# Patient Record
Sex: FEMALE | Race: WHITE | NOT HISPANIC OR LATINO | Employment: FULL TIME | URBAN - METROPOLITAN AREA
[De-identification: names, ages, dates, MRNs, and addresses within clinical notes are randomized per-mention and may not be internally consistent; named-entity substitution may affect disease eponyms.]

---

## 2019-06-26 PROBLEM — Z86.79 HISTORY OF PERICARDITIS: Status: ACTIVE | Noted: 2019-06-26

## 2019-06-26 PROBLEM — J45.909 ASTHMA: Status: ACTIVE | Noted: 2019-06-26

## 2019-07-05 ENCOUNTER — PRE VISIT (OUTPATIENT)
Dept: CARDIOLOGY | Facility: CLINIC | Age: 74
End: 2019-07-05
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
## 2019-07-05 NOTE — TELEPHONE ENCOUNTER
Attempted to call patient for more information for upcoming Dr. Gamez OV 7-22-19. Patient unable to talk.   Take to call back.

## 2019-07-20 NOTE — PROGRESS NOTES
HPI and Plan:                     CURRENT MEDICATIONS:  No current outpatient medications on file.       ALLERGIES     Allergies   Allergen Reactions     Penicillins Hives     Sulfa Drugs        PAST MEDICAL HISTORY:  Past Medical History:   Diagnosis Date     Asthma 2019     History of pericarditis 2019     NO ACTIVE PROBLEMS        PAST SURGICAL HISTORY:  Past Surgical History:   Procedure Laterality Date     APPENDECTOMY OPEN  10/15/2011    Procedure:APPENDECTOMY OPEN; Surgeon:JOHNNY HARRIS; Location: OR     SURGICAL HISTORY OF -   2012    Right carpal tunnel release     SURGICAL HISTORY OF -       Tonsillectomy       FAMILY HISTORY:  Family History   Problem Relation Age of Onset     Cancer Mother          age 54, metastatic melanoma     Hypertension Father      Diabetes Father          of MI age 74     Lipids Father      Diabetes Paternal Grandmother      Cerebrovascular Disease Paternal Grandmother        SOCIAL HISTORY:  Social History     Socioeconomic History     Marital status: Single     Spouse name: None     Number of children: 0     Years of education: None     Highest education level: None   Occupational History     Employer: SELF     Comment: [employer]   Social Needs     Financial resource strain: None     Food insecurity:     Worry: None     Inability: None     Transportation needs:     Medical: None     Non-medical: None   Tobacco Use     Smoking status: Former Smoker     Years: 10.00     Smokeless tobacco: Never Used   Substance and Sexual Activity     Alcohol use: No     Drug use: No     Sexual activity: Never   Lifestyle     Physical activity:     Days per week: None     Minutes per session: None     Stress: None   Relationships     Social connections:     Talks on phone: None     Gets together: None     Attends Nondenominational service: None     Active member of club or organization: None     Attends meetings of clubs or organizations: None     Relationship  "status: None     Intimate partner violence:     Fear of current or ex partner: None     Emotionally abused: None     Physically abused: None     Forced sexual activity: None   Other Topics Concern     Parent/sibling w/ CABG, MI or angioplasty before 65F 55M? Not Asked   Social History Narrative     None       Review of Systems:  Skin:  Positive for lumps or bumps     Eyes:  Positive for glasses    ENT:  Negative      Respiratory:  Positive for dyspnea on exertion 2 months ago   Cardiovascular:    Positive for;edema chest pressure 2 months ago  Gastroenterology: Positive for heartburn    Genitourinary:  Negative      Musculoskeletal:  Positive for joint pain    Neurologic:  Negative      Psychiatric:  Negative      Heme/Lymph/Imm:  Positive for allergies    Endocrine:  Negative        Physical Exam:  Vitals: /66   Pulse 76   Ht 1.575 m (5' 2\")   Wt 73.6 kg (162 lb 3.2 oz)   BMI 29.67 kg/m      Constitutional:           Skin:  warm and dry to the touch, no apparent skin lesions or masses noted          Head:  normocephalic, no masses or lesions        Eyes:           Lymph:      ENT:  no pallor or cyanosis, dentition good        Neck:           Respiratory:  normal breath sounds, clear to auscultation, normal A-P diameter, normal symmetry, normal respiratory excursion, no use of accessory muscles         Cardiac: regular rhythm, normal S1/S2, no S3 or S4, apical impulse not displaced, no murmurs, gallops or rubs                pulses full and equal, no bruits auscultated                                        GI:  abdomen soft, non-tender, BS normoactive, no mass, no HSM, no bruits        Extremities and Muscular Skeletal:  no deformities, clubbing, cyanosis, erythema observed              Neurological:  no gross motor deficits        Psych:           CC  Prince Willis MD  [address] [place] [id number]              "

## 2019-07-22 ENCOUNTER — OFFICE VISIT (OUTPATIENT)
Dept: CARDIOLOGY | Facility: CLINIC | Age: 74
End: 2019-07-22
Payer: MEDICARE

## 2019-07-22 VITALS
BODY MASS INDEX: 29.85 KG/M2 | WEIGHT: 162.2 LBS | SYSTOLIC BLOOD PRESSURE: 106 MMHG | HEART RATE: 76 BPM | DIASTOLIC BLOOD PRESSURE: 66 MMHG | HEIGHT: 62 IN

## 2019-07-22 DIAGNOSIS — Z86.79 HISTORY OF PERICARDITIS: Primary | ICD-10-CM

## 2019-07-22 DIAGNOSIS — R07.9 CHEST PAIN, UNSPECIFIED TYPE: ICD-10-CM

## 2019-07-22 PROCEDURE — 99204 OFFICE O/P NEW MOD 45 MIN: CPT | Performed by: INTERNAL MEDICINE

## 2019-07-22 PROCEDURE — 93000 ELECTROCARDIOGRAM COMPLETE: CPT | Performed by: INTERNAL MEDICINE

## 2019-07-22 ASSESSMENT — MIFFLIN-ST. JEOR: SCORE: 1178.98

## 2019-07-22 NOTE — LETTER
2019    Prince Willis MD  [address] [id number]    RE: Ella Woodson       Dear Colleague,    I had the pleasure of seeing Ella Woodson in the [facility].    HPI and Plan:                     CURRENT MEDICATIONS:  No current outpatient medications on file.       ALLERGIES     Allergies   Allergen Reactions     Penicillins Hives     Sulfa Drugs        PAST MEDICAL HISTORY:  Past Medical History:   Diagnosis Date     Asthma 2019     History of pericarditis 2019     NO ACTIVE PROBLEMS        PAST SURGICAL HISTORY:  Past Surgical History:   Procedure Laterality Date     APPENDECTOMY OPEN  10/15/2011    Procedure:APPENDECTOMY OPEN; Surgeon:JOHNNY HARRIS; Location: OR     SURGICAL HISTORY OF -   2012    Right carpal tunnel release     SURGICAL HISTORY OF -       Tonsillectomy       FAMILY HISTORY:  Family History   Problem Relation Age of Onset     Cancer Mother          age 54, metastatic melanoma     Hypertension Father      Diabetes Father          of MI age 74     Lipids Father      Diabetes Paternal Grandmother      Cerebrovascular Disease Paternal Grandmother        SOCIAL HISTORY:  Social History     Socioeconomic History     Marital status: Single     Spouse name: None     Number of children: 0     Years of education: None     Highest education level: None   Occupational History     Employer: SELF     Comment: [employer]   Social Needs     Financial resource strain: None     Food insecurity:     Worry: None     Inability: None     Transportation needs:     Medical: None     Non-medical: None   Tobacco Use     Smoking status: Former Smoker     Years: 10.00     Smokeless tobacco: Never Used   Substance and Sexual Activity     Alcohol use: No     Drug use: No     Sexual activity: Never   Lifestyle     Physical activity:     Days per week: None     Minutes per session: None     Stress: None   Relationships      "Social connections:     Talks on phone: None     Gets together: None     Attends Confucianist service: None     Active member of club or organization: None     Attends meetings of clubs or organizations: None     Relationship status: None     Intimate partner violence:     Fear of current or ex partner: None     Emotionally abused: None     Physically abused: None     Forced sexual activity: None   Other Topics Concern     Parent/sibling w/ CABG, MI or angioplasty before 65F 55M? Not Asked   Social History Narrative     None       Review of Systems:  Skin:  Positive for lumps or bumps     Eyes:  Positive for glasses    ENT:  Negative      Respiratory:  Positive for dyspnea on exertion 2 months ago   Cardiovascular:    Positive for;edema chest pressure 2 months ago  Gastroenterology: Positive for heartburn    Genitourinary:  Negative      Musculoskeletal:  Positive for joint pain    Neurologic:  Negative      Psychiatric:  Negative      Heme/Lymph/Imm:  Positive for allergies    Endocrine:  Negative        Physical Exam:  Vitals: /66   Pulse 76   Ht 1.575 m (5' 2\")   Wt 73.6 kg (162 lb 3.2 oz)   BMI 29.67 kg/m       Constitutional:           Skin:  warm and dry to the touch, no apparent skin lesions or masses noted          Head:  normocephalic, no masses or lesions        Eyes:           Lymph:      ENT:  no pallor or cyanosis, dentition good        Neck:           Respiratory:  normal breath sounds, clear to auscultation, normal A-P diameter, normal symmetry, normal respiratory excursion, no use of accessory muscles         Cardiac: regular rhythm, normal S1/S2, no S3 or S4, apical impulse not displaced, no murmurs, gallops or rubs                pulses full and equal, no bruits auscultated                                        GI:  abdomen soft, non-tender, BS normoactive, no mass, no HSM, no bruits        Extremities and Muscular Skeletal:  no deformities, clubbing, cyanosis, erythema observed          "     Neurological:  no gross motor deficits        Psych:           CC  Prince Willis MD  [address] [place] [id number]                Thank you for allowing me to participate in the care of your patient.      Sincerely,     Reese Leonardo MD     [facility]    cc:   Prince Willis MD  [address] [place] [id number]

## 2019-07-22 NOTE — PROGRESS NOTES
Service Date: 07/22/2019      REASON FOR CONSULTATION:  Chest discomfort.      HISTORY OF PRESENT ILLNESS:  I had the pleasure of seeing Ms. Woodson in consultation at the [facility] today.  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  She has seen Dr. Gamez as well as Dr. Engel in the past, with a history of pericarditis back in 2007.  She has not had any Cardiology followup since 2013.  At that time, she saw Dr. Gamez for chest discomfort.  A stress echocardiogram was subsequently performed in 01/2014 and was essentially normal.      She presents today with concerns regarding substernal chest discomfort that occurred approximately 4-5 weeks ago.  She states that it was substernal in nature and she noted it while climbing stairs at home.  It did not radiate but was associated with shortness of breath.  The symptoms have not recurred since then.  She has been quite active, both at work and with occasional walking on the treadmill.  She has not noted any subsequent symptoms while doing so.  She denies any PND, orthopnea or lower extremity edema.  She denies any syncope or presyncope.  She maintains a very healthy lifestyle and does not take any scheduled medications.      Her past medical history, family history, social history, allergies and medications are reviewed in my Epic note.  Her physical exam is dictated below.      EKG obtained today demonstrated normal sinus rhythm with no acute ST-T wave abnormalities.  Axis and intervals were normal.      IMPRESSION:   1.  Substernal chest discomfort with exertion 4-5 weeks ago.  The symptoms have not recurred since then.   2.  History of pericarditis in the past.      Ms. Woodson presents for an evaluation regarding substernal chest discomfort with exertion that occurred about 4-5 weeks ago and has not recurred since then.  She does not have many traditional risk factors, but given her age, I think it is reasonable to risk  stratify her further with an exercise stress echocardiogram.  She is agreeable with having this performed, but wishes to defer this until September.  I think this is an acceptable plan as long as her symptoms do not recur.        We also discussed the role of coronary artery calcium scoring for risk stratification and guidance related to lipid-lowering therapy.  At this point in time, she wishes to defer further testing.      It was a pleasure seeing her in followup today.  We will communicate the results of the stress echocardiogram with her and decide if any further workup is indicated.      It was a pleasure seeing her in consultation.         RUY WILSON MD             D: 2019   T: 2019   MT: SERGE      Name:     RAVEN PEREZ   MRN:      [id number]        Account:      [id number]   :      1945           Service Date: 2019      Document: [id number]

## 2019-07-22 NOTE — LETTER
7/22/2019      Prince Willis MD  [address] [place] [id number]      RE: Ella Woodson       Dear Colleague,    I had the pleasure of seeing Ella Woodson in the [facility].    Service Date: 07/22/2019      REASON FOR CONSULTATION:  Chest discomfort.      HISTORY OF PRESENT ILLNESS:  I had the pleasure of seeing Ms. Woodson in consultation at the [facility] today.  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  She has seen Dr. Gamez as well as Dr. Engel in the past, with a history of pericarditis back in 2007.  She has not had any Cardiology followup since 2013.  At that time, she saw Dr. Gamez for chest discomfort.  A stress echocardiogram was subsequently performed in 01/2014 and was essentially normal.      She presents today with concerns regarding substernal chest discomfort that occurred approximately 4-5 weeks ago.  She states that it was substernal in nature and she noted it while climbing stairs at home.  It did not radiate but was associated with shortness of breath.  The symptoms have not recurred since then.  She has been quite active, both at work and with occasional walking on the treadmill.  She has not noted any subsequent symptoms while doing so.  She denies any PND, orthopnea or lower extremity edema.  She denies any syncope or presyncope.  She maintains a very healthy lifestyle and does not take any scheduled medications.      Her past medical history, family history, social history, allergies and medications are reviewed in my Epic note.  Her physical exam is dictated below.      EKG obtained today demonstrated normal sinus rhythm with no acute ST-T wave abnormalities.  Axis and intervals were normal.        IMPRESSION:   1.  Substernal chest discomfort with exertion 4-5 weeks ago.  The symptoms have not recurred since then.   2.  History of pericarditis in the past.      Ms. Woodson presents for an evaluation  regarding substernal chest discomfort with exertion that occurred about 4-5 weeks ago and has not recurred since then.  She does not have many traditional risk factors, but given her age, I think it is reasonable to risk stratify her further with an exercise stress echocardiogram.  She is agreeable with having this performed, but wishes to defer this until September.  I think this is an acceptable plan as long as her symptoms do not recur.        We also discussed the role of coronary artery calcium scoring for risk stratification and guidance related to lipid-lowering therapy.  At this point in time, she wishes to defer further testing.      It was a pleasure seeing her in followup today.  We will communicate the results of the stress echocardiogram with her and decide if any further workup is indicated.      It was a pleasure seeing her in consultation.         REESE WILSON MD             D: 2019   T: 2019   MT: SERGE      Name:     RAVEN PEREZ   MRN:      [id number]        Account:      [id number]   :      1945           Service Date: 2019      Document: [id number]         No outpatient encounter medications on file as of 2019.     No facility-administered encounter medications on file as of 2019.        Again, thank you for allowing me to participate in the care of your patient.      Sincerely,    Reese Wilson MD     [facility]

## 2020-01-15 ENCOUNTER — TELEPHONE (OUTPATIENT)
Dept: CARDIOLOGY | Facility: CLINIC | Age: 75
End: 2020-01-15

## 2020-01-15 NOTE — TELEPHONE ENCOUNTER
Pt called wondering about how fast treadmill goes for exercise portion of stress echo. Pt states she has repeatedly put off her stress echo due to ongoing foot/ankle problems. She states that if she has to walk/run at a rate greater than 5mi/hr, she doesn't think she would be able to complete the test. Pt declines a chemical stress test.     Will have echo department call pt to review ins/outs of test. Pt will call back if she does not think she will be able to perform the test. Will discuss alternative options at that time.

## 2020-01-27 ENCOUNTER — TELEPHONE (OUTPATIENT)
Dept: CARDIOLOGY | Facility: CLINIC | Age: 75
End: 2020-01-27

## 2020-01-27 NOTE — TELEPHONE ENCOUNTER
Called Patient, Patient requests speaking with echo departments as she has additional questions about test regarding mets, speed...  Will contact echo Dept to request a call.

## 2020-01-27 NOTE — TELEPHONE ENCOUNTER
----- Message from Kelley Sevilla sent at 1/27/2020  9:53 AM CST -----  Regarding: Please call pt  Hi,    Patient has called and r/s her ECHO stress test- she is asking for more information on the test if someone can call her back.    Thank you,  Delaney

## 2020-02-18 ENCOUNTER — HOSPITAL ENCOUNTER (OUTPATIENT)
Dept: CARDIOLOGY | Facility: CLINIC | Age: 75
Discharge: HOME | End: 2020-02-18
Attending: INTERNAL MEDICINE | Admitting: INTERNAL MEDICINE
Payer: MEDICARE

## 2020-02-18 ENCOUNTER — TELEPHONE (OUTPATIENT)
Dept: CARDIOLOGY | Facility: CLINIC | Age: 75
End: 2020-02-18

## 2020-02-18 DIAGNOSIS — R07.9 CHEST PAIN, UNSPECIFIED TYPE: ICD-10-CM

## 2020-02-18 PROCEDURE — 93018 CV STRESS TEST I&R ONLY: CPT | Performed by: INTERNAL MEDICINE

## 2020-02-18 PROCEDURE — 93321 DOPPLER ECHO F-UP/LMTD STD: CPT | Mod: 26 | Performed by: INTERNAL MEDICINE

## 2020-02-18 PROCEDURE — 93350 STRESS TTE ONLY: CPT | Mod: TC

## 2020-02-18 PROCEDURE — 93350 STRESS TTE ONLY: CPT | Mod: 26 | Performed by: INTERNAL MEDICINE

## 2020-02-18 PROCEDURE — 93325 DOPPLER ECHO COLOR FLOW MAPG: CPT | Mod: 26 | Performed by: INTERNAL MEDICINE

## 2020-02-18 PROCEDURE — 93016 CV STRESS TEST SUPVJ ONLY: CPT | Performed by: INTERNAL MEDICINE

## 2020-02-18 NOTE — TELEPHONE ENCOUNTER
Called patient to review stress echo results as show no ischemia under stress today. Patient states she had one episode of chest discomfort and dyspnea 6 months ago. She states she has felt well since then and is pleased with her results.    Reviewed the option of CT calcium score test for more definitive picture of her coronary plaque level. Patient states due to the use of radiation and contrast dye she is not ready to have this done. She will call back if she wants any further cardiac assessment.

## 2020-02-18 NOTE — TELEPHONE ENCOUNTER
Exercise stress echo completed today, result below. Ordered by Dr Leonardo in 7/2019 for patient's reports of substernal chest pressure. Routed to Dr Leonardo for next steps.     Interpretation Summary  This was a normal stress echocardiogram with no evidence of stress-induced  ischemia.

## 2024-10-31 ENCOUNTER — TELEPHONE (OUTPATIENT)
Dept: INTERNAL MEDICINE | Facility: CLINIC | Age: 79
End: 2024-10-31
Payer: MEDICARE

## 2024-10-31 NOTE — TELEPHONE ENCOUNTER
Reason for Call:  Appointment Request    Patient requesting this type of appt:  Ankle/foot pain    Requested provider:  Prince Willis MD    Reason patient unable to be scheduled: Not within requested timeframe    When does patient want to be seen/preferred time:  Tuesdays, Wednesdays or Fridays before 11am    Comments: Patient is currently scheduled for an appointment on 1/15/2025 but would like to know if she can reschedule this as a virtual appointment. Does not wish to schedule with the provider as a virtual visit without approval. Prefers to schedule for next available on a Tuesday, Wednesday or Friday with end time of 11am.    Okay to leave a detailed message?: Yes at Home number on file [phone] (home)    Call taken on 10/31/2024 at 12:34 PM by Dina Pompa

## 2024-10-31 NOTE — TELEPHONE ENCOUNTER
No, needs to be face-to-face.  I can't see her before next available - if that's not satisfactory to her, she should see another provider.

## 2024-10-31 NOTE — TELEPHONE ENCOUNTER
Patient called back, message below was relayed to patient with understanding and no further questions. She does want to keep the Jan 2025 appt.

## 2024-10-31 NOTE — TELEPHONE ENCOUNTER
Patient Contact    Attempt # 1    Was call answered?  No.  Left message on voicemail with information to call me back.    Upon call back, please inform pt that provider is unable to see pt virtually or sooner than is next available.   Note - if pt is having ortho concerns, would she want to be seen at Ortho walk-in such as [facility]?    Thank you,  Dodie Burks RN

## 2025-01-15 ENCOUNTER — OFFICE VISIT (OUTPATIENT)
Dept: INTERNAL MEDICINE | Facility: CLINIC | Age: 80
End: 2025-01-15
Payer: MEDICARE

## 2025-01-15 ENCOUNTER — ANCILLARY PROCEDURE (OUTPATIENT)
Dept: GENERAL RADIOLOGY | Facility: CLINIC | Age: 80
End: 2025-01-15
Attending: INTERNAL MEDICINE
Payer: MEDICARE

## 2025-01-15 VITALS
WEIGHT: 159.6 LBS | SYSTOLIC BLOOD PRESSURE: 118 MMHG | RESPIRATION RATE: 19 BRPM | BODY MASS INDEX: 29.19 KG/M2 | OXYGEN SATURATION: 97 % | HEART RATE: 73 BPM | DIASTOLIC BLOOD PRESSURE: 70 MMHG | TEMPERATURE: 97.1 F

## 2025-01-15 DIAGNOSIS — M25.571 PAIN IN JOINT, ANKLE AND FOOT, RIGHT: ICD-10-CM

## 2025-01-15 DIAGNOSIS — M25.571 PAIN IN JOINT, ANKLE AND FOOT, RIGHT: Primary | ICD-10-CM

## 2025-01-15 DIAGNOSIS — M25.572 PAIN IN JOINT, ANKLE AND FOOT, LEFT: ICD-10-CM

## 2025-01-15 PROCEDURE — 99203 OFFICE O/P NEW LOW 30 MIN: CPT | Performed by: INTERNAL MEDICINE

## 2025-01-15 PROCEDURE — 73610 X-RAY EXAM OF ANKLE: CPT | Mod: TC | Performed by: INTERNAL MEDICINE

## 2025-01-15 NOTE — PROGRESS NOTES
Assessment & Plan     Pain in joint, ankle and foot, right  Suspect at least some degree of degenerative arthritis bilaterally.  Check x-rays as ordered, may benefit from podiatry referral.  - XR Ankle Right G/E 3 Views; Future    Pain in joint, ankle and foot, left  As above  - XR Ankle Left G/E 3 Views; Future                Subjective   Ella is a 79 year old, presenting for the following health issues:  Musculoskeletal Problem    History of Present Illness       Reason for visit:  Request xray ankle pain persistent   She is taking medications regularly.    Bilateral ankle pain for quite some time. Does see a chiropractor and he mentioned to get a x-ray to see if there are any arthritic changes or anything else going on to explain the pain.                Review of Systems  Constitutional, HEENT, cardiovascular, pulmonary, gi and gu systems are negative, except as otherwise noted.      Objective    /70 (BP Location: Left arm, Patient Position: Sitting, Cuff Size: Adult Large)   Pulse 73   Temp 97.1  F (36.2  C) (Temporal)   Resp 19   Wt 72.4 kg (159 lb 9.6 oz)   SpO2 97%   BMI 29.19 kg/m    Body mass index is 29.19 kg/m .  Physical Exam   GENERAL: alert and no distress  MS: Exam of ankles reveals no effusions, no significant pain with active or passive range of motion.            Signed Electronically by: Prince Willis MD